# Patient Record
Sex: MALE | Race: WHITE | Employment: PART TIME | URBAN - METROPOLITAN AREA
[De-identification: names, ages, dates, MRNs, and addresses within clinical notes are randomized per-mention and may not be internally consistent; named-entity substitution may affect disease eponyms.]

---

## 2019-01-16 ENCOUNTER — TRANSFERRED RECORDS (OUTPATIENT)
Dept: PHYSICAL THERAPY | Facility: CLINIC | Age: 34
End: 2019-01-16

## 2019-01-28 ENCOUNTER — THERAPY VISIT (OUTPATIENT)
Dept: PHYSICAL THERAPY | Facility: CLINIC | Age: 34
End: 2019-01-28
Payer: MEDICARE

## 2019-01-28 DIAGNOSIS — M54.2 NECK PAIN: ICD-10-CM

## 2019-01-28 PROCEDURE — 97161 PT EVAL LOW COMPLEX 20 MIN: CPT | Mod: GP | Performed by: PHYSICAL THERAPIST

## 2019-01-28 PROCEDURE — 97110 THERAPEUTIC EXERCISES: CPT | Mod: GP | Performed by: PHYSICAL THERAPIST

## 2019-01-28 NOTE — LETTER
DEPARTMENT OF HEALTH AND HUMAN SERVICES  CENTERS FOR MEDICARE & MEDICAID SERVICES    PLAN/UPDATED PLAN OF PROGRESS FOR OUTPATIENT REHABILITATION    PATIENTS NAME:  Emanuel Green   : 1985  PR[facility] NUMBER:    [id number]  [id number]  PROVIDER NAME: [facility]  MEDICAL RECORD NUMBER: [id number]   START OF CARE DATE:  SOC Date: 19   TYPE:  PT  PRIMARY/TREATMENT DIAGNOSIS: (Pertinent Medical Diagnosis)  Neck pain  VISITS FROM START OF CARE: 1 Rxs Used: 1     [facility] Initial Evaluation    Subjective:  The history is provided by the patient. No  was used.   mEanuel Green is a 33 year old male with a cervical spine condition.  Condition occurred with:  A fall/slip.  Condition occurred: during recreation/sport.  This is a new condition  Patient reports a sudden onset of left neck pain beginning with a fall snowboard on 2019.  Patient c/o pain turning head.  Patient also sustained a concussion.  Concussion symptoms were aggravated playing floor hockey.  Patient completes in snowboarding and floor hockey.    Patient reports pain:  Cervical left side.  Radiates to:  None.  Pain is described as sharp and is constant and reported as 7/10.  Associated symptoms:  Loss of motion/stiffness (patient denies radicular symptoms). Pain is the same all the time.  Symptoms are exacerbated by rotating head and certain positions and relieved by activity/movement.  Since onset symptoms are gradually improving (pain in less diffuse).  Special tests:  X-ray and CT scan.  Previous treatment: none.    General health as reported by patient is good.  Pertinent medical history includes:  Asthma, concussions/dizziness and depression.  Medical allergies: yes.  Other surgeries include:  No.  Current medications:  Anti-depressants.  Current occupation is .  Patient is working in normal job without restrictions.    Barriers include:  Lives alone  and transportation (patient does not drive).  Red flags:  None as reported by the patient.  Objective:  Standing Alignment:    Cervical/Thoracic:  Forward head (Flexed sitting posture)  Shoulder/UE:  Rounded shoulders, protracted scapula L and protracted scapula R  Cervical/Thoracic Evaluation  AROM:  AROM Cervical:  Flexion:            WNL  Extension:       Min Loss  Rotation:         Left: Min Loss     Right: Min Loss  Side Bend:      Left: Mod Loss - Contralateral pain/stretch     Right:  Mod Loss - Contralateral pain/stretch  Strength: Fair deep neck flexor strength  Cervical Myotomes:  normal  Cervical Palpation:   PATIENTS NAME:  Emanuel Green   : 1985     Tenderness not present at Left:   Sternocleidomstoid; Scalenes; Upper Trap; Levator; Erector Spinae or Suboccipitals  Tenderness present at Right:    Sternocleidomstoid  Tenderness not present at Right:      Scalenes; Upper Trap; Levator; Erector Spinae or Suboccipitals  Assessment/Plan:    Patient is a 33 year old male with cervical complaints.    Patient has the following significant findings with corresponding treatment plan.                Diagnosis 1:  Cervical muscle strain   Pain -  self management, education and home program  Decreased ROM/flexibility - manual therapy, therapeutic exercise, therapeutic activity and home program  Decreased strength - therapeutic exercise, therapeutic activities and home program  Impaired muscle performance - neuro re-education and home program  Decreased function - therapeutic activities and home program  Impaired posture - neuro re-education, therapeutic activities and home program  Therapy Evaluation Codes:   1) History comprised of:   Personal factors that impact the plan of care:      None.    Comorbidity factors that impact the plan of care are:      Asthma and Depression.     Medications impacting care: Anti-depressant.  2) Examination of Body Systems comprised of:   Body structures and functions that  "impact the plan of care:      Cervical spine.   Activity limitations that impact the plan of care are:      Lifting, Sports, Working and Sleeping.  3) Clinical presentation characteristics are:   Stable/Uncomplicated.  4) Decision-Making    Low complexity using standardized patient assessment instrument and/or measureable assessment of functional outcome.  Cumulative Therapy Evaluation is: Low complexity.  Previous and current functional limitations:  (See Goal Flow Sheet for this information)    Short term and Long term goals: (See Goal Flow Sheet for this information)   Communication ability:  Patient appears to be able to clearly communicate and understand verbal and written communication and follow directions correctly.  Treatment Explanation - The following has been discussed with the patient:   RX ordered/plan of care  Anticipated outcomes  Possible risks and side effects  This patient would benefit from PT intervention to resume normal activities.   Rehab potential is good.  Frequency:  1 X week, once daily  Duration:  for 6 weeks  Discharge Plan:  Achieve all LTG.  Independent in home treatment program.  Reach maximal therapeutic benefit.    PATIENTS NAME:  Emanuel Green   : 1985          Caregiver Signature/Credentials _____________________________ Date ________             Emir Zepeda, PT   I have reviewed and certified the need for these services and plan of treatment while under my care.        PHYSICIAN'S SIGNATURE:   ___________________________________ Date___________                             Timmy Sahni MD    Certification period:  Beginning of Cert date period: 19 to  End of Cert period date: 19   Functional Level Progress Report: Please see attached \"Goal Flow sheet for Functional level.\"  ____X____ Continue Services or       ________ DC Services              Service dates: From  SOC Date: 19 date to present                         " (0) No drift

## 2019-01-28 NOTE — PROGRESS NOTES
[facility] Initial Evaluation  Subjective:  The history is provided by the patient. No  was used.   Emanuel Green is a 33 year old male with a cervical spine condition.  Condition occurred with:  A fall/slip.  Condition occurred: during recreation/sport.  This is a new condition  Patient reports a sudden onset of left neck pain beginning with a fall snowboard on 1/13/2019.  Patient c/o pain turning head.  Patient also sustained a concussion.  Concussion symptoms were aggravated playing floor hockey.  Patient completes in snowboarding and floor hockey.    Patient reports pain:  Cervical left side.  Radiates to:  None.  Pain is described as sharp and is constant and reported as 7/10.  Associated symptoms:  Loss of motion/stiffness (patient denies radicular symptoms). Pain is the same all the time.  Symptoms are exacerbated by rotating head and certain positions and relieved by activity/movement.  Since onset symptoms are gradually improving (pain in less diffuse).  Special tests:  X-ray and CT scan.  Previous treatment: none.    General health as reported by patient is good.  Pertinent medical history includes:  Asthma, concussions/dizziness and depression.  Medical allergies: yes.  Other surgeries include:  No.  Current medications:  Anti-depressants.  Current occupation is .  Patient is working in normal job without restrictions.      Barriers include:  Lives alone and transportation (patient does not drive).    Red flags:  None as reported by the patient.                        Objective:  Standing Alignment:    Cervical/Thoracic:  Forward head (Flexed sitting posture)  Shoulder/UE:  Rounded shoulders, protracted scapula L and protracted scapula R                                  Cervical/Thoracic Evaluation    AROM:  AROM Cervical:    Flexion:            WNL  Extension:       Min Loss  Rotation:         Left: Min Loss     Right: Min Loss  Side Bend:      Left:  Mod Loss - Contralateral pain/stretch     Right:  Mod Loss - Contralateral pain/stretch    Strength: Fair deep neck flexor strength    Cervical Myotomes:  normal                        Cervical Palpation:      Tenderness not present at Left:   Sternocleidomstoid; Scalenes; Upper Trap; Levator; Erector Spinae or Suboccipitals  Tenderness present at Right:    Sternocleidomstoid  Tenderness not present at Right:      Scalenes; Upper Trap; Levator; Erector Spinae or Suboccipitals                                                  General     ROS    Assessment/Plan:    Patient is a 33 year old male with cervical complaints.    Patient has the following significant findings with corresponding treatment plan.                Diagnosis 1:  Cervical muscle strain   Pain -  self management, education and home program  Decreased ROM/flexibility - manual therapy, therapeutic exercise, therapeutic activity and home program  Decreased strength - therapeutic exercise, therapeutic activities and home program  Impaired muscle performance - neuro re-education and home program  Decreased function - therapeutic activities and home program  Impaired posture - neuro re-education, therapeutic activities and home program    Therapy Evaluation Codes:   1) History comprised of:   Personal factors that impact the plan of care:      None.    Comorbidity factors that impact the plan of care are:      Asthma and Depression.     Medications impacting care: Anti-depressant.  2) Examination of Body Systems comprised of:   Body structures and functions that impact the plan of care:      Cervical spine.   Activity limitations that impact the plan of care are:      Lifting, Sports, Working and Sleeping.  3) Clinical presentation characteristics are:   Stable/Uncomplicated.  4) Decision-Making    Low complexity using standardized patient assessment instrument and/or measureable assessment of functional outcome.  Cumulative Therapy Evaluation is: Low  complexity.    Previous and current functional limitations:  (See Goal Flow Sheet for this information)    Short term and Long term goals: (See Goal Flow Sheet for this information)     Communication ability:  Patient appears to be able to clearly communicate and understand verbal and written communication and follow directions correctly.  Treatment Explanation - The following has been discussed with the patient:   RX ordered/plan of care  Anticipated outcomes  Possible risks and side effects  This patient would benefit from PT intervention to resume normal activities.   Rehab potential is good.    Frequency:  1 X week, once daily  Duration:  for 6 weeks  Discharge Plan:  Achieve all LTG.  Independent in home treatment program.  Reach maximal therapeutic benefit.    Please refer to the daily flowsheet for treatment today, total treatment time and time spent performing 1:1 timed codes.

## 2019-02-04 ENCOUNTER — THERAPY VISIT (OUTPATIENT)
Dept: PHYSICAL THERAPY | Facility: CLINIC | Age: 34
End: 2019-02-04
Payer: MEDICARE

## 2019-02-04 DIAGNOSIS — M54.2 NECK PAIN: ICD-10-CM

## 2019-02-04 PROCEDURE — 97110 THERAPEUTIC EXERCISES: CPT | Mod: GP | Performed by: PHYSICAL THERAPIST

## 2019-02-04 PROCEDURE — 97112 NEUROMUSCULAR REEDUCATION: CPT | Mod: GP | Performed by: PHYSICAL THERAPIST

## 2019-03-12 NOTE — PROGRESS NOTES
Discharge Note    Progress reporting period is from initial evaluation date (please see noted date below) to Feb 4, 2019.  Linked Episodes   Type: Episode: Status: Noted: Resolved: Last update: Updated by:   PHYSICAL THERAPY Neck01/28/2019 Active 1/28/2019 2/4/2019  2:48 PM Emir Zepeda, PT      Comments:       Emanuel failed to follow up and current status is unknown.  Please see information below for last relevant information on current status.  Patient seen for 2 visits.    SUBJECTIVE  Subjective changes noted by patient:  Patient reports neck is improving.  Pain and ROM are better.  HEP is going well.  .  Changes in function:  Yes (See Goal flowsheet attached for changes in current functional level)  Adverse reaction to treatment or activity: None    OBJECTIVE  Changes noted in objective findings: AROM: Min Loss B SB, Min Loss Ext, Min Loss B Rotation.     ASSESSMENT/PLAN  Diagnosis: Neck   Updated problem list and treatment plan:   Pain - HEP  Decreased ROM/flexibility - HEP  Decreased function - HEP  Decreased strength - HEP  STG/LTGs have been met or progress has been made towards goals:  Yes, please see goal flowsheet for most current information  Assessment of Progress: current status is unknown.    Last current status: Pt is progressing well   Self Management Plans:  HEP  I have re-evaluated this patient and find that the nature, scope, duration and intensity of the therapy is appropriate for the medical condition of the patient.  Emanuel continues to require the following intervention to meet STG and LTG's:  HEP.    Recommendations:  Discharge with current home program.  Patient to follow up with MD as needed.    Please refer to the daily flowsheet for treatment today, total treatment time and time spent performing 1:1 timed codes.